# Patient Record
Sex: MALE | Race: ASIAN | NOT HISPANIC OR LATINO | URBAN - METROPOLITAN AREA
[De-identification: names, ages, dates, MRNs, and addresses within clinical notes are randomized per-mention and may not be internally consistent; named-entity substitution may affect disease eponyms.]

---

## 2017-12-18 PROBLEM — 266433003 GASTRO-ESOPHAGEAL REFLUX DISEASE WITH ESOPHAGITIS: Status: ACTIVE | Noted: 2017-12-18

## 2017-12-18 PROBLEM — 72925005 CYSTIC DISEASE OF LIVER: Status: ACTIVE | Noted: 2017-10-19

## 2018-01-02 PROBLEM — 92343006 BENIGN NEOPLASM OF SIGMOID COLON: Status: ACTIVE | Noted: 2017-12-18

## 2018-01-02 PROBLEM — 37657006 DISORDER OF ESOPHAGUS: Status: ACTIVE | Noted: 2018-01-01

## 2020-10-01 ENCOUNTER — OFFICE VISIT (OUTPATIENT)
Dept: URBAN - METROPOLITAN AREA CLINIC 37 | Facility: CLINIC | Age: 60
End: 2020-10-01

## 2020-10-01 VITALS — HEIGHT: 67 IN

## 2020-10-01 RX ORDER — TENOFOVIR DISOPROXIL FUMARATE 300 MG/1
1 TABLET TABLET, COATED ORAL ONCE A DAY
Qty: 30 | Refills: 5 | COMMUNITY

## 2020-10-01 RX ORDER — TENOFOVIR DISOPROXIL FUMARATE 300 MG/1
1 TABLET TABLET, COATED ORAL ONCE A DAY
Qty: 30 | Refills: 5

## 2020-10-01 RX ORDER — OMEPRAZOLE 20 MG/1
1 CAPSULE CAPSULE, DELAYED RELEASE ORAL
Qty: 90 | Refills: 3 | OUTPATIENT

## 2020-10-01 RX ORDER — OMEPRAZOLE 20 MG/1
1 CAPSULE CAPSULE, DELAYED RELEASE ORAL
Qty: 90 | Refills: 3 | COMMUNITY

## 2020-10-01 NOTE — HPI-MIGRATED HPI
Interim investigations : Labs done on: ->  * 05/09/2019, ordered by Dr. Davis Yeager:  - CMP: Glu: 98; BUN: 20; Cr: 0.81; Na: 141; K: 4.8; Alb: 4.2; ALT: 28; AST: 28; ALP: 79; Tbili: 1.2 - PT: 11.9 (H); INR: 1.1 - CBC: WBC: 3.2 (L); RBC: 5.42; Hgb: 16.0; Hct: 47.0; Plt: 79 (L) - AFP: 4.1 - HCV DNA: &lt; 10 IU/mL;   Interim investigations : Imaging studies: ->  * US Abd on 03/07/19: patient walked out. Did he done US at other place??? * US Abd on 09/06/18: Cirrhosis without focal solid lesion. One small ~ 2cm simple cyst in the right lobe of the liver. No GS. Splenomegaly. Otherwise, normal US abdomen. ;   Follow up OV : Patient is here for a routine OV for -> cirrhosis and gastritis. * Cirrhosis of liver: Last EGD was on 12/2018 which showed grade I esophageal varices, GERD and gastritis.  Planned to repeat surveillance EGD on 12/2019 to re-assess findings but patient didnt continue to follow up until now.  Patient has been following up with Dr. Yeager at [facility]  * Gastritis Patient continues taking Omeprazole 20mg Current Sx:??;   Colorectal cancer screening : Patient is here for -> high risk surveillance colonoscopy due to personal history of colonic polyps;   Colorectal cancer screening : Last colonoscopy was -> on 11/13/17, one tubular adenoma polyp was detected and resected. However patient has a sub-optimal bowel prep. Therefore, advised to repeat in 3 years;   Colorectal cancer screening : Family history of GI malignancy: -> Denies;   Colorectal cancer screening : Current bowel movement patterns -> ?;   Colorectal cancer screening : Patients denies -> rectal bleeding, change in bowel habits, constipation, diarrhea, or abdominal pain;   Hep- B : Response to therapy has been -> ?;   Hep- B : Patient currently has been on -> Tenofovir 300mg;   Hep- B : Patient is here for -> re-establish care/treatment for chronic hepatitis B;   Hep- B : Patient denies any symptoms of -> jaundice, ascites/abd. distension, peripheral edema/swelling in legs, hematemesis or melena, weight loss, change in stool color, shortness of breath, fever, fatigue, depression/Anxiety, nausea/vomiting;   Hep- B : Last follow up OV was -> more than 1 year ago. Patient hasn't continued following up with us. Instead patient has been following up with Dr. Yeager at  [facility] ;   Hep- B : Family history of liver or GI malignancy -> denies;   Hep- B : Diagnosis was made -> years ago on routine labs;   Hep- B : Patient denies/admits using supplemental herbal -> denies using supplemental herbal products;     Colorectal cancer screening : Denies dysphagia or odynophagia Currently taking anticoagulants/NSAIDs: ?;

## 2020-10-06 ENCOUNTER — LAB OUTSIDE AN ENCOUNTER (OUTPATIENT)
Dept: URBAN - METROPOLITAN AREA CLINIC 37 | Facility: CLINIC | Age: 60
End: 2020-10-06

## 2020-10-07 ENCOUNTER — OFFICE VISIT (OUTPATIENT)
Dept: URBAN - METROPOLITAN AREA CLINIC 37 | Facility: CLINIC | Age: 60
End: 2020-10-07

## 2020-10-07 ENCOUNTER — LAB OUTSIDE AN ENCOUNTER (OUTPATIENT)
Dept: URBAN - METROPOLITAN AREA CLINIC 35 | Facility: CLINIC | Age: 60
End: 2020-10-07

## 2020-10-07 ENCOUNTER — TELEPHONE ENCOUNTER (OUTPATIENT)
Dept: URBAN - METROPOLITAN AREA CLINIC 35 | Facility: CLINIC | Age: 60
End: 2020-10-07

## 2020-10-07 VITALS — WEIGHT: 155 LBS | HEIGHT: 67 IN | BODY MASS INDEX: 24.33 KG/M2

## 2020-10-07 PROBLEM — 186639003 CHRONIC VIRAL HEPATITIS B WITHOUT DELTA-AGENT: Status: ACTIVE | Noted: 2017-10-19

## 2020-10-07 PROBLEM — 14223005 ESOPHAGEAL VARICES WITHOUT BLEEDING: Status: ACTIVE | Noted: 2017-12-18

## 2020-10-07 PROBLEM — 275978004 SCREENING FOR MALIGNANT NEOPLASM OF COLON: Status: ACTIVE | Noted: 2017-10-19

## 2020-10-07 PROBLEM — 196731005 GASTRODUODENITIS: Status: ACTIVE | Noted: 2017-12-18

## 2020-10-07 PROBLEM — 19943007 CIRRHOSIS OF LIVER: Status: ACTIVE | Noted: 2017-10-19

## 2020-10-07 RX ORDER — OMEPRAZOLE 20 MG/1
1 CAPSULE CAPSULE, DELAYED RELEASE ORAL
Qty: 90 | Refills: 3 | Status: ACTIVE | COMMUNITY

## 2020-10-07 RX ORDER — OMEPRAZOLE 20 MG/1
1 CAPSULE CAPSULE, DELAYED RELEASE ORAL
Qty: 90 | Refills: 3 | OUTPATIENT

## 2020-10-07 RX ORDER — SODIUM SULFATE, POTASSIUM SULFATE, MAGNESIUM SULFATE 17.5; 3.13; 1.6 G/ML; G/ML; G/ML
AS DIRECTED SOLUTION, CONCENTRATE ORAL ONCE
Qty: 1 KIT | Refills: 0 | OUTPATIENT
Start: 2020-10-06

## 2020-10-07 RX ORDER — TENOFOVIR ALAFENAMIDE 25 MG/1
1 TABLET WITH FOOD TABLET ORAL ONCE A DAY
Qty: 30 TABLET | Refills: 5 | OUTPATIENT
Start: 2020-10-07

## 2020-10-07 RX ORDER — UBIDECARENONE/VIT E ACET 100MG-5
1 CAPSULE CAPSULE ORAL ONCE A DAY
Qty: 30 | Status: ACTIVE | COMMUNITY

## 2020-10-07 RX ORDER — TENOFOVIR DISOPROXIL FUMARATE 300 MG/1
1 TABLET TABLET, COATED ORAL ONCE A DAY
Qty: 30 | Refills: 5 | Status: ACTIVE | COMMUNITY

## 2020-10-07 NOTE — HPI-MIGRATED HPI
Interim investigations : Labs done on: ->  * 05/09/2019, ordered by Dr. Davis Yeager:  - CMP: Glu: 98; BUN: 20; Cr: 0.81; Na: 141; K: 4.8; Alb: 4.2; ALT: 28; AST: 28; ALP: 79; Tbili: 1.2 - PT: 11.9 (H); INR: 1.1 - CBC: WBC: 3.2 (L); RBC: 5.42; Hgb: 16.0; Hct: 47.0; Plt: 79 (L) - AFP: 4.1 - HCV DNA: &lt; 10 IU/mL;   Interim investigations : Imaging studies: ->  * US Abd on 03/07/19: patient walked out. Did he done US at other place??? * US Abd on 09/06/18: Cirrhosis without focal solid lesion. One small ~ 2cm simple cyst in the right lobe of the liver. No GS. Splenomegaly. Otherwise, normal US abdomen. ;   Follow up OV : Patient is here for a routine OV for -> cirrhosis and gastritis. * Cirrhosis of liver: Last EGD was on 12/2018 which showed grade I esophageal varices, GERD and gastritis.  Planned to repeat surveillance EGD on 12/2019 to re-assess findings but patient didnt continue to follow up until now.  Patient has been following up with Dr. Yeager at [facility] [place]  * Gastritis Patient continues taking Omeprazole 20mg Current Sx:??;   Colorectal cancer screening : Current bowel movement patterns -> ?;   Colorectal cancer screening : Patient is here for -> high risk surveillance colonoscopy due to personal history of colonic polyps;   Colorectal cancer screening : Last colonoscopy was -> on 11/13/17, one tubular adenoma polyp was detected and resected. However patient has a sub-optimal bowel prep. Therefore, advised to repeat in 3 years;   Colorectal cancer screening : Family history of GI malignancy: -> Denies;   Colorectal cancer screening : Patients denies -> rectal bleeding, change in bowel habits, constipation, diarrhea, or abdominal pain;   Hep- B : Patient denies any symptoms of -> jaundice, ascites/abd. distension, peripheral edema/swelling in legs, hematemesis or melena, weight loss, change in stool color, shortness of breath, fever, fatigue, depression/Anxiety, nausea/vomiting;   Hep- B : Last follow up OV was -> more than 1 year ago. Patient hasn't continued following up with us. Instead patient has been following up with Dr. Yeager at  [facility] ;   Hep- B : Family history of liver or GI malignancy -> denies;   Hep- B : Patient denies/admits using supplemental herbal -> denies using supplemental herbal products;   Hep- B : Patient is here for -> re-establish care/treatment for chronic hepatitis B;   Hep- B : Patient currently has been on -> None. He has stopped Tenofovir 300mg due to not seeing any symptoms ;   Hep- B : Diagnosis was made -> years ago on routine labs;   Hep- B : Response to therapy has been -> Unknown;     Colorectal cancer screening : Denies dysphagia or odynophagia Currently taking anticoagulants/NSAIDs: ?;

## 2020-10-09 ENCOUNTER — LAB OUTSIDE AN ENCOUNTER (OUTPATIENT)
Dept: URBAN - METROPOLITAN AREA CLINIC 37 | Facility: CLINIC | Age: 60
End: 2020-10-09

## 2020-11-09 ENCOUNTER — OFFICE VISIT (OUTPATIENT)
Dept: URBAN - METROPOLITAN AREA MEDICAL CENTER 10 | Facility: MEDICAL CENTER | Age: 60
End: 2020-11-09

## 2020-11-24 PROBLEM — 428283002 HISTORY OF POLYP OF COLON (SITUATION): Status: ACTIVE | Noted: 2018-04-30

## 2020-11-30 ENCOUNTER — OFFICE VISIT (OUTPATIENT)
Dept: URBAN - METROPOLITAN AREA CLINIC 37 | Facility: CLINIC | Age: 60
End: 2020-11-30

## 2020-11-30 ENCOUNTER — DASHBOARD ENCOUNTERS (OUTPATIENT)
Age: 60
End: 2020-11-30

## 2020-11-30 RX ORDER — TENOFOVIR ALAFENAMIDE 25 MG/1
1 TABLET WITH FOOD TABLET ORAL ONCE A DAY
Qty: 30 TABLET | Refills: 5 | COMMUNITY
Start: 2020-10-07

## 2020-11-30 RX ORDER — UBIDECARENONE/VIT E ACET 100MG-5
1 CAPSULE CAPSULE ORAL ONCE A DAY
Qty: 30 | COMMUNITY

## 2020-11-30 RX ORDER — TENOFOVIR DISOPROXIL FUMARATE 300 MG/1
1 TABLET TABLET, COATED ORAL ONCE A DAY
Qty: 30 | Refills: 5 | COMMUNITY

## 2020-11-30 RX ORDER — OMEPRAZOLE 20 MG/1
1 CAPSULE CAPSULE, DELAYED RELEASE ORAL
Qty: 90 | Refills: 3 | COMMUNITY

## 2020-11-30 RX ORDER — OMEPRAZOLE 20 MG/1
1 CAPSULE CAPSULE, DELAYED RELEASE ORAL
Qty: 90 | Refills: 3 | OUTPATIENT

## 2020-11-30 RX ORDER — TENOFOVIR ALAFENAMIDE 25 MG/1
1 TABLET WITH FOOD TABLET ORAL ONCE A DAY
Qty: 30 TABLET | Refills: 5 | OUTPATIENT

## 2020-11-30 NOTE — HPI-MIGRATED HPI
Interim investigations : Labs done on: ->  * 10/09/2020 ordered by PCP:  - CMP: Glu: 151 (H) ; BUN: 11 ; Cr: 0.69 (L) ; Na: 140 ; K: 3.8 ; Alb: 4.1 ; Tbili: 1.4 (H) ; ALP: 88 ; ALT: 36 ; AST: 41 - CBC: WBC: 3.3 (L) ; RBC: 4.96 ; Hgb: 14.5 ; Hct: 43.9 ; Plt: 62 (L) - HBV DNA PRC : 17 IU/mL (H) - AFP: 4.8 * 05/09/2019, ordered by Dr. Davis Yeager:  - CMP: Glu: 98; BUN: 20; Cr: 0.81; Na: 141; K: 4.8; Alb: 4.2; ALT: 28; AST: 28; ALP: 79; Tbili: 1.2 - PT: 11.9 (H); INR: 1.1 - CBC: WBC: 3.2 (L); RBC: 5.42; Hgb: 16.0; Hct: 47.0; Plt: 79 (L) - AFP: 4.1 - HCV DNA: &lt; 10 IU/mL;   Interim investigations : Imaging studies: ->  * US Abd on 10/30/2020:  1. Irregular and lobulated liver. Consistent with cirrhosis. 2.2cm mildly complex cyst right lobe liver. Recommend multiphasic CT of the liver with and without contrast  2. Prominent portal vein 3. Limited views of pancreas, right lobe liver and gallbladder due to bowel gas.  4. Gallbladder's wall is at upper normal limits with no obvious stones. this may be reactive from liver disease. If there is concern of acalculous cholecystitis, consider HIDA scan.  5. Cysts bilateral kidneys. Larger on the right kidney  6. Moderate enlarged spleen, likely due to portal hypertension.  * US Abd on 09/06/18: Cirrhosis without focal solid lesion. One small ~ 2cm simple cyst in the right lobe of the liver. No GS. Splenomegaly. Otherwise, normal US abdomen;   Follow up OV : Last OV was -> 1 month ago He had EGD done in 11/2020 to evaluate for varices progression . Results as above Last year, patient was following up with Dr. Yeager at [facility] ;   Follow up OV : Current symptoms are -> ???;   Follow up OV : Patient is here for a routine OV for -> Cirrhosis ;   Hep- B : Family history of liver or GI malignancy -> denies;   Hep- B : Diagnosis was made -> years ago on routine labs;   Hep- B : Last follow up OV was -> 1 month ago;   Hep- B : Patient is here for -> routine follow up for chronic hepatitis B;   Hep- B : Patient denies any symptoms of -> jaundice ascites/abd. distension peripheral edema/swelling in legs hematemesis or melena weight loss change in stool color shortness of breath fever fatigue depression/Anxiety nausea/vomiting;   Hep- B : Patient denies/admits using supplemental herbal -> denies using supplemental herbal products;   Hep- B : Patient currently has been on -> Vemlidy 25mg QD;   Hep- B : Response to therapy has been -> Unknown;   Post-op OV : After Colonoscopy on -> 11/09/2020, at which time two small polyps were detected and resected. Histology showed they were colonic mucosa with lymphoid aggregated, and colonic mucosa with hyperplastic features. Non-bleeding grade II internal and external hemorrhoids were found during retroflexion but not banded. The quality of bowel prep was fair;   Post-op OV : After EGD on -> on the same date, the report showed medium sized grade II esophageal varices were found in the lower third of the esophagus. Normal upper third and middle third of esophagus. Moderate antral gastritis in were found in the antrum with bxx showing mild chronic gastritis and regenerative changes. Mild gastrtis were found in the gastric body with bxx showing mild chronic gastritis and regenerative changes. Negative for H.pylori and IM. Normal duodenum.  Patient had EGD done to reassess history of esophageal varices and gastritis.  Patient was prescribed with Omeprazole 40mg QD after the procedure Surveillance EGD in one year;   Post-op OV : Patient denies -> rectal bleeding, fever, nausea & vomiting since the procedure date;   Post-op OV : Patient -> denies any new changes in his/her health status since last OV;   Post-op OV : Patient reports of current symptoms -> denies GI symptoms Current BM: 1-2 soft BMs daily;

## 2022-06-22 ENCOUNTER — OFFICE VISIT (OUTPATIENT)
Dept: URBAN - METROPOLITAN AREA CLINIC 105 | Facility: CLINIC | Age: 62
End: 2022-06-22